# Patient Record
Sex: MALE | Race: ASIAN | NOT HISPANIC OR LATINO | Employment: FULL TIME | URBAN - METROPOLITAN AREA
[De-identification: names, ages, dates, MRNs, and addresses within clinical notes are randomized per-mention and may not be internally consistent; named-entity substitution may affect disease eponyms.]

---

## 2018-12-12 ENCOUNTER — NON-PROVIDER VISIT (OUTPATIENT)
Dept: OCCUPATIONAL MEDICINE | Facility: CLINIC | Age: 43
End: 2018-12-12

## 2018-12-12 DIAGNOSIS — Z02.1 PRE-EMPLOYMENT DRUG SCREENING: ICD-10-CM

## 2018-12-12 LAB
AMP AMPHETAMINE: NORMAL
COC COCAINE: NORMAL
INT CON NEG: NORMAL
INT CON POS: NORMAL
MET METHAMPHETAMINES: NORMAL
OPI OPIATES: NORMAL
PCP PHENCYCLIDINE: NORMAL
POC DRUG COMMENT 753798-OCCUPATIONAL HEALTH: NORMAL
THC: NORMAL

## 2018-12-12 PROCEDURE — 80305 DRUG TEST PRSMV DIR OPT OBS: CPT | Performed by: PREVENTIVE MEDICINE

## 2019-05-04 ENCOUNTER — HOSPITAL ENCOUNTER (OUTPATIENT)
Dept: LAB | Facility: MEDICAL CENTER | Age: 44
End: 2019-05-04
Attending: FAMILY MEDICINE
Payer: COMMERCIAL

## 2019-05-04 LAB
ALBUMIN SERPL BCP-MCNC: 4.3 G/DL (ref 3.2–4.9)
ALBUMIN/GLOB SERPL: 1.7 G/DL
ALP SERPL-CCNC: 65 U/L (ref 30–99)
ALT SERPL-CCNC: 44 U/L (ref 2–50)
ANION GAP SERPL CALC-SCNC: 8 MMOL/L (ref 0–11.9)
AST SERPL-CCNC: 28 U/L (ref 12–45)
BILIRUB SERPL-MCNC: 0.6 MG/DL (ref 0.1–1.5)
BUN SERPL-MCNC: 24 MG/DL (ref 8–22)
CALCIUM SERPL-MCNC: 9.1 MG/DL (ref 8.5–10.5)
CHLORIDE SERPL-SCNC: 108 MMOL/L (ref 96–112)
CHOLEST SERPL-MCNC: 181 MG/DL (ref 100–199)
CO2 SERPL-SCNC: 26 MMOL/L (ref 20–33)
CREAT SERPL-MCNC: 1.18 MG/DL (ref 0.5–1.4)
CREAT UR-MCNC: 238.3 MG/DL
FASTING STATUS PATIENT QL REPORTED: NORMAL
GLOBULIN SER CALC-MCNC: 2.5 G/DL (ref 1.9–3.5)
GLUCOSE SERPL-MCNC: 94 MG/DL (ref 65–99)
HDLC SERPL-MCNC: 37 MG/DL
LDLC SERPL CALC-MCNC: 122 MG/DL
MICROALBUMIN UR-MCNC: 0.8 MG/DL
MICROALBUMIN/CREAT UR: 3 MG/G (ref 0–30)
POTASSIUM SERPL-SCNC: 4.1 MMOL/L (ref 3.6–5.5)
PROT SERPL-MCNC: 6.8 G/DL (ref 6–8.2)
SODIUM SERPL-SCNC: 142 MMOL/L (ref 135–145)
TRIGL SERPL-MCNC: 112 MG/DL (ref 0–149)

## 2019-05-04 PROCEDURE — 80061 LIPID PANEL: CPT

## 2019-05-04 PROCEDURE — 36415 COLL VENOUS BLD VENIPUNCTURE: CPT

## 2019-05-04 PROCEDURE — 82043 UR ALBUMIN QUANTITATIVE: CPT

## 2019-05-04 PROCEDURE — 80053 COMPREHEN METABOLIC PANEL: CPT

## 2019-05-04 PROCEDURE — 82570 ASSAY OF URINE CREATININE: CPT

## 2021-10-17 ENCOUNTER — OFFICE VISIT (OUTPATIENT)
Dept: URGENT CARE | Facility: PHYSICIAN GROUP | Age: 46
End: 2021-10-17
Payer: COMMERCIAL

## 2021-10-17 ENCOUNTER — HOSPITAL ENCOUNTER (OUTPATIENT)
Dept: RADIOLOGY | Facility: MEDICAL CENTER | Age: 46
End: 2021-10-17
Attending: PHYSICIAN ASSISTANT
Payer: COMMERCIAL

## 2021-10-17 VITALS
HEART RATE: 91 BPM | RESPIRATION RATE: 16 BRPM | TEMPERATURE: 97.8 F | HEIGHT: 65 IN | DIASTOLIC BLOOD PRESSURE: 80 MMHG | BODY MASS INDEX: 34.62 KG/M2 | SYSTOLIC BLOOD PRESSURE: 110 MMHG | OXYGEN SATURATION: 99 % | WEIGHT: 207.8 LBS

## 2021-10-17 DIAGNOSIS — R10.9 LEFT FLANK PAIN: ICD-10-CM

## 2021-10-17 DIAGNOSIS — N20.0 NEPHROLITHIASIS: ICD-10-CM

## 2021-10-17 LAB
APPEARANCE UR: CLEAR
BILIRUB UR STRIP-MCNC: NEGATIVE MG/DL
COLOR UR AUTO: YELLOW
GLUCOSE UR STRIP.AUTO-MCNC: NEGATIVE MG/DL
KETONES UR STRIP.AUTO-MCNC: NEGATIVE MG/DL
LEUKOCYTE ESTERASE UR QL STRIP.AUTO: NEGATIVE
NITRITE UR QL STRIP.AUTO: NEGATIVE
PH UR STRIP.AUTO: 6 [PH] (ref 5–8)
PROT UR QL STRIP: 30 MG/DL
RBC UR QL AUTO: NORMAL
SP GR UR STRIP.AUTO: 1.03
UROBILINOGEN UR STRIP-MCNC: 0.2 MG/DL

## 2021-10-17 PROCEDURE — 81002 URINALYSIS NONAUTO W/O SCOPE: CPT | Performed by: PHYSICIAN ASSISTANT

## 2021-10-17 PROCEDURE — 74176 CT ABD & PELVIS W/O CONTRAST: CPT

## 2021-10-17 PROCEDURE — 99204 OFFICE O/P NEW MOD 45 MIN: CPT | Performed by: PHYSICIAN ASSISTANT

## 2021-10-17 RX ORDER — KETOROLAC TROMETHAMINE 30 MG/ML
30 INJECTION, SOLUTION INTRAMUSCULAR; INTRAVENOUS ONCE
Status: COMPLETED | OUTPATIENT
Start: 2021-10-17 | End: 2021-10-17

## 2021-10-17 RX ORDER — LOSARTAN POTASSIUM 25 MG/1
25 TABLET ORAL DAILY
COMMUNITY
End: 2023-11-27

## 2021-10-17 RX ADMIN — KETOROLAC TROMETHAMINE 30 MG: 30 INJECTION, SOLUTION INTRAMUSCULAR; INTRAVENOUS at 15:26

## 2021-10-17 ASSESSMENT — ENCOUNTER SYMPTOMS
ABDOMINAL PAIN: 1
FEVER: 0
BELCHING: 0
DIARRHEA: 0
CONSTIPATION: 1

## 2021-10-17 NOTE — PROGRESS NOTES
"  Subjective:   Regan Argueta is a 46 y.o. male who presents today with   Chief Complaint   Patient presents with   • Abdominal Pain     left, upper x today       Abdominal Pain  This is a new problem. The current episode started today. The onset quality is sudden. The problem occurs constantly. The problem has been unchanged. The pain is located in the left flank. The pain is moderate. The quality of the pain is aching and sharp. Associated symptoms include constipation. Pertinent negatives include no belching, diarrhea or fever. The pain is relieved by nothing. He has tried nothing for the symptoms. The treatment provided no relief.     Patient states he woke up with left-sided pain this morning.  Shortly after he woke up he had a bowel movement but was very small.  The pain did not get any better.  There was no blood in the stool.  Patient states this pain has happened before about a year ago and he spoke with his primary care about it but he states the pain resolved itself after a couple of hours at that time and he did have some blood in the stool a year ago.  No further work-up was obtained at that time.  No recent injury or trauma.   PMH:  has no past medical history on file.  MEDS:   Current Outpatient Medications:   •  losartan (COZAAR) 25 MG Tab, Take 25 mg by mouth every day., Disp: , Rfl:   ALLERGIES: No Known Allergies  SURGHX: History reviewed. No pertinent surgical history.  SOCHX:  reports that he has never smoked. He has never used smokeless tobacco.  FH: Family history of kidney stones.    Review of Systems   Constitutional: Negative for fever.   Gastrointestinal: Positive for abdominal pain and constipation. Negative for diarrhea.      Objective:   /80 (BP Location: Left arm, Patient Position: Sitting, BP Cuff Size: Adult)   Pulse 91   Temp 36.6 °C (97.8 °F) (Temporal)   Resp 16   Ht 1.651 m (5' 5\")   Wt 94.3 kg (207 lb 12.8 oz)   SpO2 99%   BMI 34.58 kg/m²   Physical Exam  Vitals and " nursing note reviewed.   Constitutional:       General: He is not in acute distress.     Appearance: Normal appearance. He is well-developed. He is not ill-appearing or toxic-appearing.   HENT:      Head: Normocephalic and atraumatic.      Right Ear: Hearing normal.      Left Ear: Hearing normal.   Eyes:      Conjunctiva/sclera: Conjunctivae normal.   Cardiovascular:      Rate and Rhythm: Normal rate and regular rhythm.      Heart sounds: Normal heart sounds.   Pulmonary:      Effort: Pulmonary effort is normal.   Abdominal:      General: Bowel sounds are normal.      Palpations: Abdomen is soft.      Tenderness: There is no abdominal tenderness. There is left CVA tenderness. There is no right CVA tenderness. Negative signs include Cordon's sign and McBurney's sign.   Genitourinary:     Comments: Patient deferred  exam  Musculoskeletal:      Comments: Normal movement in all 4 extremities.  No tenderness to palpation to the paraspinous muscles or midline of the spine.   Skin:     General: Skin is warm and dry.   Neurological:      Mental Status: He is alert.      Coordination: Coordination normal.   Psychiatric:         Mood and Affect: Mood normal.       UA + protein and blood  CT RENAL  FINDINGS:     Renal stone: There is an obstructing 4 mm calculus in the proximal left ureter with moderate upstream collecting system dilatation. Mild left perinephric stranding. Nonobstructive bilateral renal calculi.     Lung Bases:     No pulmonary nodules at the lung bases. No pleural or pericardial fluid.     Small hiatal hernia.     Abdomen:     Within the limits of noncontrast technique, the spleen, pancreas, and adrenal glands are unremarkable in appearance.     Hepatic steatosis.     The gallbladder is unremarkable     There is no biliary dilatation. There is no bowel wall thickening or abnormal dilatation.     Normal appendix.     The abdominal aorta is normal in caliber.     Pelvis:     Unremarkable urinary bladder.  Prostate calcification.     Bilateral pelvic phleboliths.     No lymph node enlargement.     No free fluid, or free air in the abdomen or pelvis.     No aggressive bone lesions are seen.     _____________________________________     IMPRESSION:        1. Obstructing 4 mm calculus in the proximal left ureter with moderate left hydronephrosis.     2. Nonobstructive bilateral renal calculi.        3. Hepatic steatosis. Small hiatal hernia.  Assessment/Plan:   Assessment    1. Left flank pain  - POCT Urinalysis  - CT-RENAL COLIC EVALUATION(A/P W/O); Future  - ketorolac (TORADOL) injection 30 mg    2. Nephrolithiasis  - REFERRAL TO UROLOGY    Other orders  - losartan (COZAAR) 25 MG Tab; Take 25 mg by mouth every day.  Will rule out kidney stones with a CT scan at this time.  Patient tolerated Toradol shot in clinic today.  Differential includes kidney stone versus constipation versus other abdominal etiology.  The soonest we can get a CT is this evening and discussed with patient that if his symptoms get any worse in the meantime he should go into the emergency room for evaluation and treatment.  Differential diagnosis, natural history, supportive care, and indications for immediate follow-up discussed.   Patient given instructions and understanding of medications and treatment.    If not improving in 3-5 days, F/U with PCP or return to  if symptoms worsen.    Patient agreeable to plan.  Greater than 45 minutes were spent reviewing patient's chart, examining and obtaining history from patient, and discussing plan of care.   Called and discussed CT results with patient.  He is understanding and appreciative of my call.  He states that the Toradol shot did help his symptoms.  Discussed with him that if his symptoms persist or get any worse he should report to the emergency room.  Should follow-up with urology.  Continue increasing fluid intake.  If urine output decreases another reason to go to the ER and he is  understanding of this.  Suspect spontaneous passage of the stone given the size and location.  Please note that this dictation was created using voice recognition software. I have made every reasonable attempt to correct obvious errors, but I expect that there are errors of grammar and possibly content that I did not discover before finalizing the note.    Denton Buckner PA-C

## 2021-10-18 ENCOUNTER — TELEPHONE (OUTPATIENT)
Dept: URGENT CARE | Facility: CLINIC | Age: 46
End: 2021-10-18

## 2023-11-27 ENCOUNTER — OFFICE VISIT (OUTPATIENT)
Dept: MEDICAL GROUP | Facility: PHYSICIAN GROUP | Age: 48
End: 2023-11-27
Payer: COMMERCIAL

## 2023-11-27 ENCOUNTER — HOSPITAL ENCOUNTER (OUTPATIENT)
Dept: LAB | Facility: MEDICAL CENTER | Age: 48
End: 2023-11-27
Attending: INTERNAL MEDICINE
Payer: COMMERCIAL

## 2023-11-27 VITALS
DIASTOLIC BLOOD PRESSURE: 90 MMHG | WEIGHT: 186 LBS | OXYGEN SATURATION: 95 % | HEART RATE: 94 BPM | BODY MASS INDEX: 31.76 KG/M2 | RESPIRATION RATE: 16 BRPM | TEMPERATURE: 98.2 F | HEIGHT: 64 IN | SYSTOLIC BLOOD PRESSURE: 134 MMHG

## 2023-11-27 DIAGNOSIS — Z11.59 NEED FOR HEPATITIS C SCREENING TEST: ICD-10-CM

## 2023-11-27 DIAGNOSIS — N20.0 KIDNEY STONE: ICD-10-CM

## 2023-11-27 DIAGNOSIS — R35.0 FREQUENT URINATION: ICD-10-CM

## 2023-11-27 DIAGNOSIS — Z23 NEED FOR VACCINATION: ICD-10-CM

## 2023-11-27 DIAGNOSIS — E66.3 OVERWEIGHT: ICD-10-CM

## 2023-11-27 DIAGNOSIS — I10 ESSENTIAL HYPERTENSION: ICD-10-CM

## 2023-11-27 DIAGNOSIS — E78.5 DYSLIPIDEMIA: ICD-10-CM

## 2023-11-27 DIAGNOSIS — Z00.00 WELL ADULT EXAM: ICD-10-CM

## 2023-11-27 DIAGNOSIS — Z12.11 COLON CANCER SCREENING: ICD-10-CM

## 2023-11-27 LAB
25(OH)D3 SERPL-MCNC: 21 NG/ML (ref 30–100)
ALT SERPL-CCNC: 23 U/L (ref 2–50)
ANION GAP SERPL CALC-SCNC: 26 MMOL/L (ref 7–16)
APPEARANCE UR: CLEAR
BACTERIA #/AREA URNS HPF: NEGATIVE /HPF
BILIRUB UR QL STRIP.AUTO: NEGATIVE
BUN SERPL-MCNC: 15 MG/DL (ref 8–22)
CALCIUM SERPL-MCNC: 9.7 MG/DL (ref 8.5–10.5)
CHLORIDE SERPL-SCNC: 94 MMOL/L (ref 96–112)
CHOLEST SERPL-MCNC: 419 MG/DL (ref 100–199)
CO2 SERPL-SCNC: 15 MMOL/L (ref 20–33)
COLOR UR: YELLOW
CREAT SERPL-MCNC: 0.92 MG/DL (ref 0.5–1.4)
EPI CELLS #/AREA URNS HPF: NEGATIVE /HPF
ERYTHROCYTE [DISTWIDTH] IN BLOOD BY AUTOMATED COUNT: 36.8 FL (ref 35.9–50)
EST. AVERAGE GLUCOSE BLD GHB EST-MCNC: 309 MG/DL
FASTING STATUS PATIENT QL REPORTED: NORMAL
GFR SERPLBLD CREATININE-BSD FMLA CKD-EPI: 101 ML/MIN/1.73 M 2
GLUCOSE SERPL-MCNC: 337 MG/DL (ref 65–99)
GLUCOSE UR STRIP.AUTO-MCNC: >=1000 MG/DL
HBA1C MFR BLD: 12.4 % (ref 4–5.6)
HCT VFR BLD AUTO: 51.8 % (ref 42–52)
HCV AB SER QL: NORMAL
HDLC SERPL-MCNC: ABNORMAL MG/DL
HGB BLD-MCNC: 18.7 G/DL (ref 14–18)
HYALINE CASTS #/AREA URNS LPF: ABNORMAL /LPF
KETONES UR STRIP.AUTO-MCNC: 40 MG/DL
LDLC SERPL CALC-MCNC: ABNORMAL MG/DL
LEUKOCYTE ESTERASE UR QL STRIP.AUTO: NEGATIVE
MCH RBC QN AUTO: 29.9 PG (ref 27–33)
MCHC RBC AUTO-ENTMCNC: 36.1 G/DL (ref 32.3–36.5)
MCV RBC AUTO: 82.9 FL (ref 81.4–97.8)
MICRO URNS: ABNORMAL
NITRITE UR QL STRIP.AUTO: NEGATIVE
PH UR STRIP.AUTO: 5.5 [PH] (ref 5–8)
PLATELET # BLD AUTO: 290 K/UL (ref 164–446)
PMV BLD AUTO: 10.1 FL (ref 9–12.9)
POTASSIUM SERPL-SCNC: 4.4 MMOL/L (ref 3.6–5.5)
PROT UR QL STRIP: 100 MG/DL
PSA SERPL-MCNC: 1.78 NG/ML (ref 0–4)
RBC # BLD AUTO: 6.25 M/UL (ref 4.7–6.1)
RBC # URNS HPF: ABNORMAL /HPF
RBC UR QL AUTO: NEGATIVE
SODIUM SERPL-SCNC: 135 MMOL/L (ref 135–145)
SP GR UR STRIP.AUTO: >=1.045
TRIGL SERPL-MCNC: 1679 MG/DL (ref 0–149)
TSH SERPL DL<=0.005 MIU/L-ACNC: 1.14 UIU/ML (ref 0.38–5.33)
UROBILINOGEN UR STRIP.AUTO-MCNC: 0.2 MG/DL
WBC # BLD AUTO: 6.6 K/UL (ref 4.8–10.8)
WBC #/AREA URNS HPF: ABNORMAL /HPF

## 2023-11-27 PROCEDURE — 3075F SYST BP GE 130 - 139MM HG: CPT | Performed by: INTERNAL MEDICINE

## 2023-11-27 PROCEDURE — 83036 HEMOGLOBIN GLYCOSYLATED A1C: CPT

## 2023-11-27 PROCEDURE — 84153 ASSAY OF PSA TOTAL: CPT

## 2023-11-27 PROCEDURE — 90471 IMMUNIZATION ADMIN: CPT | Performed by: INTERNAL MEDICINE

## 2023-11-27 PROCEDURE — 3080F DIAST BP >= 90 MM HG: CPT | Performed by: INTERNAL MEDICINE

## 2023-11-27 PROCEDURE — 80061 LIPID PANEL: CPT

## 2023-11-27 PROCEDURE — 90472 IMMUNIZATION ADMIN EACH ADD: CPT | Performed by: INTERNAL MEDICINE

## 2023-11-27 PROCEDURE — 84460 ALANINE AMINO (ALT) (SGPT): CPT

## 2023-11-27 PROCEDURE — 80048 BASIC METABOLIC PNL TOTAL CA: CPT

## 2023-11-27 PROCEDURE — 36415 COLL VENOUS BLD VENIPUNCTURE: CPT

## 2023-11-27 PROCEDURE — 99214 OFFICE O/P EST MOD 30 MIN: CPT | Mod: 25 | Performed by: INTERNAL MEDICINE

## 2023-11-27 PROCEDURE — 90686 IIV4 VACC NO PRSV 0.5 ML IM: CPT | Performed by: INTERNAL MEDICINE

## 2023-11-27 PROCEDURE — 87086 URINE CULTURE/COLONY COUNT: CPT

## 2023-11-27 PROCEDURE — 90715 TDAP VACCINE 7 YRS/> IM: CPT | Performed by: INTERNAL MEDICINE

## 2023-11-27 PROCEDURE — 81001 URINALYSIS AUTO W/SCOPE: CPT

## 2023-11-27 PROCEDURE — 85027 COMPLETE CBC AUTOMATED: CPT

## 2023-11-27 PROCEDURE — 86803 HEPATITIS C AB TEST: CPT

## 2023-11-27 PROCEDURE — 82306 VITAMIN D 25 HYDROXY: CPT

## 2023-11-27 PROCEDURE — 84443 ASSAY THYROID STIM HORMONE: CPT

## 2023-11-27 RX ORDER — AMLODIPINE BESYLATE 5 MG/1
5 TABLET ORAL DAILY
Qty: 90 TABLET | Refills: 3 | Status: SHIPPED | OUTPATIENT
Start: 2023-11-27

## 2023-11-27 ASSESSMENT — PATIENT HEALTH QUESTIONNAIRE - PHQ9: CLINICAL INTERPRETATION OF PHQ2 SCORE: 0

## 2023-11-27 NOTE — ASSESSMENT & PLAN NOTE
This is a new condition.  The patient reported frequent urination noted since last 2 months.  Patient denies fever or chills dysuria or hematuria.  Patient also reported frequent thirst as well as nighttime urination up to 4 times per night.

## 2023-11-27 NOTE — ASSESSMENT & PLAN NOTE
Chronic condition.  Discussed with the patient regarding diet, exercise, and lifestyle modification to help achieve and maintain healthy weight

## 2023-11-27 NOTE — ASSESSMENT & PLAN NOTE
Chronic condition.  Noted with chart review.  The patient currently on diet therapy.  Lab test requested for follow-up.

## 2023-11-27 NOTE — PROGRESS NOTES
PRIMARY CARE CLINIC VISIT        Chief Complaint   Patient presents with    Establish Care   New patient here to establish care  Frequent urination  Hypertension  Overweight  Request influenza and Tdap vaccine  Kidney stone          History of Present Illness     Frequent urination  This is a new condition.  The patient reported frequent urination noted since last 2 months.  Patient denies fever or chills dysuria or hematuria.  Patient also reported frequent thirst as well as nighttime urination up to 4 times per night.    Essential hypertension  Chronic condition.  The patient reported that he stopped taking losartan due to erectile dysfunction.  Currently patient not on therapy.    Overweight  Chronic condition.  Discussed with the patient regarding diet, exercise, and lifestyle modification to help achieve and maintain healthy weight         Need for vaccination  Patient is due for influenza vaccine and Tdap    Kidney stone  Chronic condition.  The patient currently not on therapy.  He denies recent kidney stone flareup.    Dyslipidemia  Chronic condition.  Noted with chart review.  The patient currently on diet therapy.  Lab test requested for follow-up.    No current outpatient medications on file prior to visit.     No current facility-administered medications on file prior to visit.        Allergies: Patient has no known allergies.    Current Outpatient Medications Ordered in Epic   Medication Sig Dispense Refill    amLODIPine (NORVASC) 5 MG Tab Take 1 Tablet by mouth every day. 90 Tablet 3     No current Epic-ordered facility-administered medications on file.       History reviewed. No pertinent past medical history.    History reviewed. No pertinent surgical history.    Family History   Problem Relation Age of Onset    Stroke Mother     Kidney Disease Father        Social History     Tobacco Use   Smoking Status Former    Types: Cigarettes   Smokeless Tobacco Never       Social History     Substance and  "Sexual Activity   Alcohol Use None    Comment: occasionally       Review of systems.  As per HPI above. All other systems reviewed and negative.      Past Medical, Social, and Family history reviewed and updated in EPIC     Objective     BP (!) 134/90 (BP Location: Right arm, Patient Position: Sitting, BP Cuff Size: Adult)   Pulse 94   Temp 36.8 °C (98.2 °F) (Temporal)   Resp 16   Ht 1.626 m (5' 4\")   Wt 84.4 kg (186 lb)   SpO2 95%    Body mass index is 31.93 kg/m².    General: alert in no apparent distress.  Cardiovascular: regular rate and rhythm  Pulmonary: lungs : no wheezing   Gastrointestinal: BS present.    normal male genitalia no urethral discharge  Rectal no active bleeding prostate no obvious mass nontender the size of the prostate felt to be enlarged on exam.  Nontender      No results found for: \"HBA1C\"    No results found for: \"WBC\", \"HEMOGLOBIN\", \"HEMATOCRIT\", \"MCV\", \"PLATELETCT\"      Lab Results   Component Value Date/Time    SODIUM 142 05/04/2019 08:57 AM    POTASSIUM 4.1 05/04/2019 08:57 AM    GLUCOSE 94 05/04/2019 08:57 AM    BUN 24 (H) 05/04/2019 08:57 AM    CREATININE 1.18 05/04/2019 08:57 AM       Lab Results   Component Value Date/Time    CHOLSTRLTOT 181 05/04/2019 08:57 AM    TRIGLYCERIDE 112 05/04/2019 08:57 AM    HDL 37 (A) 05/04/2019 08:57 AM     (H) 05/04/2019 08:57 AM       Lab Results   Component Value Date/Time    ALTSGPT 44 05/04/2019 08:57 AM             Assessment and Plan     1. Frequent urination  This is a new condition.  Rule out possible diabetes versus renal insufficiency versus prostate hypertrophy versus overactive bladder versus others  Lab test ordered today including chemistry and urinalysis.  Recommend follow-up after lab test done.  If all of the labs came back okay consider treating the patient with tamsulosin  Recommend follow-up few days after lab test done.    2. Essential hypertension  Chronic condition.  Uncontrolled.  Prescribed amlodipine 5 mg " daily.  Recommend office follow-up approximately 4 weeks for blood pressure recheck.  Recommend sodium restriction    3. Dyslipidemia  Chronic condition.  Current status unclear.  Lab test ordered for follow-up    4. Overweight  Chronic condition per uncontrolled.  Recommend diet exercise.  Courage patient to lose weight.    5. Kidney stone  Chronic condition.  Patient currently asymptomatic.  Advised the patient to stay well-hydrated.  Continue to monitor    6. Need for vaccination  - Tdap Vaccine =>6YO IM  - INFLUENZA VACCINE QUAD INJ (PF)    7. Well adult exam  - HEMOGLOBIN A1C; Future  - ALANINE AMINO-TRANS; Future  - Basic Metabolic Panel; Future  - CBC WITHOUT DIFFERENTIAL; Future  - Lipid Profile; Future  - PROSTATE SPECIFIC AG SCREENING; Future  - TSH; Future  - URINALYSIS; Future  - URINE CULTURE(NEW); Future  - VITAMIN D,25 HYDROXY (DEFICIENCY); Future    8. Colon cancer screening  - COLOGUARD (FIT DNA)    Other orders  - amLODIPine (NORVASC) 5 MG Tab; Take 1 Tablet by mouth every day.  Dispense: 90 Tablet; Refill: 3                      Please note that this dictation was created using voice recognition software. I have made every reasonable attempt to correct obvious errors, but I expect that there are errors of grammar and possibly content that I did not discover before finalizing the note.    Denis Jose MD  Internal Medicine  [facility]

## 2023-11-27 NOTE — ASSESSMENT & PLAN NOTE
Chronic condition.  The patient reported that he stopped taking losartan due to erectile dysfunction.  Currently patient not on therapy.

## 2023-11-29 DIAGNOSIS — E11.9 TYPE 2 DIABETES MELLITUS WITHOUT COMPLICATION, WITH LONG-TERM CURRENT USE OF INSULIN (HCC): ICD-10-CM

## 2023-11-29 DIAGNOSIS — R80.0 ISOLATED PROTEINURIA WITHOUT SPECIFIC MORPHOLOGIC LESION: ICD-10-CM

## 2023-11-29 DIAGNOSIS — Z79.4 TYPE 2 DIABETES MELLITUS WITHOUT COMPLICATION, WITH LONG-TERM CURRENT USE OF INSULIN (HCC): ICD-10-CM

## 2023-11-29 DIAGNOSIS — E55.9 VITAMIN D DEFICIENCY: ICD-10-CM

## 2023-11-29 DIAGNOSIS — E78.5 DYSLIPIDEMIA: ICD-10-CM

## 2023-11-29 LAB
BACTERIA UR CULT: NORMAL
SIGNIFICANT IND 70042: NORMAL
SITE SITE: NORMAL
SOURCE SOURCE: NORMAL

## 2023-11-29 RX ORDER — ROSUVASTATIN CALCIUM 20 MG/1
20 TABLET, COATED ORAL EVERY EVENING
Qty: 90 TABLET | Refills: 3 | Status: SHIPPED | OUTPATIENT
Start: 2023-11-29

## 2023-11-29 RX ORDER — EMPAGLIFLOZIN, METFORMIN HYDROCHLORIDE 12.5; 1 MG/1; MG/1
1 TABLET, EXTENDED RELEASE ORAL 2 TIMES DAILY
Qty: 180 TABLET | Refills: 3 | Status: SHIPPED | OUTPATIENT
Start: 2023-11-29

## 2024-06-26 DIAGNOSIS — Z79.4 TYPE 2 DIABETES MELLITUS WITHOUT COMPLICATION, WITH LONG-TERM CURRENT USE OF INSULIN (HCC): ICD-10-CM

## 2024-06-26 DIAGNOSIS — E11.9 TYPE 2 DIABETES MELLITUS WITHOUT COMPLICATION, WITH LONG-TERM CURRENT USE OF INSULIN (HCC): ICD-10-CM

## 2024-07-02 ENCOUNTER — TELEPHONE (OUTPATIENT)
Dept: HEALTH INFORMATION MANAGEMENT | Facility: OTHER | Age: 49
End: 2024-07-02

## 2024-12-17 RX ORDER — ROSUVASTATIN CALCIUM 20 MG/1
20 TABLET, COATED ORAL EVERY EVENING
Qty: 60 TABLET | Refills: 0 | Status: SHIPPED | OUTPATIENT
Start: 2024-12-17

## 2024-12-17 NOTE — TELEPHONE ENCOUNTER
Received request via: Pharmacy    Was the patient seen in the last year in this department? Yes    Does the patient have an active prescription (recently filled or refills available) for medication(s) requested? No    Pharmacy Name:   [id number] - [place], [place] - [address] AT [facility] & [address]  [address] [facility] [address]  Phone: [phone] Fax: [phone]        Does the patient have MCFP Plus and need 100-day supply? (This applies to ALL medications) Patient does not have SCP

## 2024-12-19 RX ORDER — AMLODIPINE BESYLATE 5 MG/1
5 TABLET ORAL DAILY
Qty: 30 TABLET | Refills: 0 | Status: SHIPPED | OUTPATIENT
Start: 2024-12-19

## 2024-12-19 NOTE — TELEPHONE ENCOUNTER
Received request via: Pharmacy    Was the patient seen in the last year in this department? No 11/27/2023    Does the patient have an active prescription (recently filled or refills available) for medication(s) requested? No    Pharmacy Name: [id number] - [place], [place] - [address] [facility]  [address]     Does the patient have FCI Plus and need 100-day supply? (This applies to ALL medications) Patient does not have SCP

## 2025-04-07 RX ORDER — OLMESARTAN MEDOXOMIL 20 MG/1
1 TABLET ORAL
COMMUNITY
End: 2025-04-08 | Stop reason: SDUPTHER

## 2025-04-07 RX ORDER — TAMSULOSIN HYDROCHLORIDE 0.4 MG/1
CAPSULE ORAL
COMMUNITY
End: 2025-04-08 | Stop reason: SDUPTHER

## 2025-04-08 ENCOUNTER — RESULTS FOLLOW-UP (OUTPATIENT)
Dept: MEDICAL GROUP | Facility: PHYSICIAN GROUP | Age: 50
End: 2025-04-08

## 2025-04-08 ENCOUNTER — HOSPITAL ENCOUNTER (OUTPATIENT)
Facility: MEDICAL CENTER | Age: 50
End: 2025-04-08
Attending: INTERNAL MEDICINE
Payer: COMMERCIAL

## 2025-04-08 ENCOUNTER — APPOINTMENT (OUTPATIENT)
Dept: MEDICAL GROUP | Facility: PHYSICIAN GROUP | Age: 50
End: 2025-04-08

## 2025-04-08 VITALS
TEMPERATURE: 98 F | HEIGHT: 64 IN | OXYGEN SATURATION: 99 % | RESPIRATION RATE: 16 BRPM | BODY MASS INDEX: 32.5 KG/M2 | WEIGHT: 190.4 LBS | HEART RATE: 74 BPM | SYSTOLIC BLOOD PRESSURE: 122 MMHG | DIASTOLIC BLOOD PRESSURE: 78 MMHG

## 2025-04-08 DIAGNOSIS — K44.9 HIATAL HERNIA: ICD-10-CM

## 2025-04-08 DIAGNOSIS — Z23 NEED FOR VACCINATION: ICD-10-CM

## 2025-04-08 DIAGNOSIS — R80.0 ISOLATED PROTEINURIA WITHOUT SPECIFIC MORPHOLOGIC LESION: Chronic | ICD-10-CM

## 2025-04-08 DIAGNOSIS — E11.59 HYPERTENSION ASSOCIATED WITH TYPE 2 DIABETES MELLITUS (HCC): Chronic | ICD-10-CM

## 2025-04-08 DIAGNOSIS — I15.2 HYPERTENSION ASSOCIATED WITH TYPE 2 DIABETES MELLITUS (HCC): Chronic | ICD-10-CM

## 2025-04-08 DIAGNOSIS — Z12.5 SCREENING FOR MALIGNANT NEOPLASM OF PROSTATE: ICD-10-CM

## 2025-04-08 DIAGNOSIS — E78.5 TYPE 2 DIABETES MELLITUS WITH HYPERLIPIDEMIA (HCC): ICD-10-CM

## 2025-04-08 DIAGNOSIS — N20.0 KIDNEY STONE: ICD-10-CM

## 2025-04-08 DIAGNOSIS — E11.69 DYSLIPIDEMIA ASSOCIATED WITH TYPE 2 DIABETES MELLITUS (HCC): Chronic | ICD-10-CM

## 2025-04-08 DIAGNOSIS — E11.69 TYPE 2 DIABETES MELLITUS WITH HYPERLIPIDEMIA (HCC): ICD-10-CM

## 2025-04-08 DIAGNOSIS — E66.3 OVERWEIGHT: Chronic | ICD-10-CM

## 2025-04-08 DIAGNOSIS — E78.5 DYSLIPIDEMIA ASSOCIATED WITH TYPE 2 DIABETES MELLITUS (HCC): Chronic | ICD-10-CM

## 2025-04-08 DIAGNOSIS — K76.0 HEPATIC STEATOSIS: ICD-10-CM

## 2025-04-08 DIAGNOSIS — N40.0 PROSTATE HYPERTROPHY: ICD-10-CM

## 2025-04-08 PROBLEM — R35.0 FREQUENT URINATION: Status: RESOLVED | Noted: 2023-11-27 | Resolved: 2025-04-08

## 2025-04-08 PROBLEM — E55.9 VITAMIN D DEFICIENCY: Status: RESOLVED | Noted: 2023-11-29 | Resolved: 2025-04-08

## 2025-04-08 LAB
CREAT UR-MCNC: 75.5 MG/DL
HBA1C MFR BLD: 6.2 % (ref ?–5.8)
MICROALBUMIN UR-MCNC: 3 MG/DL
MICROALBUMIN/CREAT UR: 40 MG/G (ref 0–30)
POCT INT CON NEG: NEGATIVE
POCT INT CON POS: POSITIVE

## 2025-04-08 PROCEDURE — 3078F DIAST BP <80 MM HG: CPT | Performed by: INTERNAL MEDICINE

## 2025-04-08 PROCEDURE — 92250 FUNDUS PHOTOGRAPHY W/I&R: CPT | Mod: TC | Performed by: INTERNAL MEDICINE

## 2025-04-08 PROCEDURE — 90677 PCV20 VACCINE IM: CPT

## 2025-04-08 PROCEDURE — 82043 UR ALBUMIN QUANTITATIVE: CPT

## 2025-04-08 PROCEDURE — 90471 IMMUNIZATION ADMIN: CPT

## 2025-04-08 PROCEDURE — 3074F SYST BP LT 130 MM HG: CPT | Performed by: INTERNAL MEDICINE

## 2025-04-08 PROCEDURE — 99215 OFFICE O/P EST HI 40 MIN: CPT | Mod: 25 | Performed by: INTERNAL MEDICINE

## 2025-04-08 PROCEDURE — 82570 ASSAY OF URINE CREATININE: CPT

## 2025-04-08 PROCEDURE — 83036 HEMOGLOBIN GLYCOSYLATED A1C: CPT | Performed by: INTERNAL MEDICINE

## 2025-04-08 RX ORDER — EMPAGLIFLOZIN, METFORMIN HYDROCHLORIDE 12.5; 1 MG/1; MG/1
1 TABLET, EXTENDED RELEASE ORAL 2 TIMES DAILY
Qty: 180 TABLET | Refills: 3 | Status: SHIPPED | OUTPATIENT
Start: 2025-04-08

## 2025-04-08 RX ORDER — AMLODIPINE BESYLATE 5 MG/1
5 TABLET ORAL DAILY
Qty: 90 TABLET | Refills: 3 | Status: SHIPPED | OUTPATIENT
Start: 2025-04-08

## 2025-04-08 RX ORDER — ROSUVASTATIN CALCIUM 20 MG/1
20 TABLET, COATED ORAL EVERY EVENING
Qty: 90 TABLET | Refills: 3 | Status: SHIPPED | OUTPATIENT
Start: 2025-04-08

## 2025-04-08 RX ORDER — OLMESARTAN MEDOXOMIL 20 MG/1
20 TABLET ORAL
Qty: 90 TABLET | Refills: 3 | Status: SHIPPED | OUTPATIENT
Start: 2025-04-08

## 2025-04-08 RX ORDER — TAMSULOSIN HYDROCHLORIDE 0.4 MG/1
0.4 CAPSULE ORAL DAILY
Qty: 90 CAPSULE | Refills: 3 | Status: SHIPPED | OUTPATIENT
Start: 2025-04-08

## 2025-04-08 ASSESSMENT — PATIENT HEALTH QUESTIONNAIRE - PHQ9: CLINICAL INTERPRETATION OF PHQ2 SCORE: 0

## 2025-04-08 NOTE — ASSESSMENT & PLAN NOTE
This is a chronic condition.  The patient has not been seen for over 1 year.  Patient asymptomatic.  Patient currently taking Synjardy 1 tablet twice daily.    Hemoglobin A1c today 6.2%

## 2025-04-08 NOTE — PROGRESS NOTES
PRIMARY CARE CLINIC VISIT        Chief Complaint   Patient presents with    Follow-Up    Medication Refill      Follow-up diabetes  Hypertension  Hyperlipidemia  Overweight  Proteinuria  Prostate hypertrophy  Rx refills  Kidney stone  Vaccination update  hepatic steatosis  Hiatal hernia  Request lab tests        History of Present Illness     Type 2 diabetes mellitus with hyperlipidemia (HCC)  This is a chronic condition.  The patient has not been seen for over 1 year.  Patient asymptomatic.  Patient currently taking Synjardy 1 tablet twice daily.    Hemoglobin A1c today 6.2%    Hypertension associated with type 2 diabetes mellitus (HCC)  Chronic condition.  The patient currently taking Benicar.  He also take amlodipine daily.  Patient denies chest pain shortness of breath palpitation or near syncope    Overweight  Body mass index is 32.68 kg/m².   Chronic condition.  Recommend pt to follow a healthy , well balance diet  Pt to continue with regular exercise activity and to maintain ideal weight.        Dyslipidemia associated with type 2 diabetes mellitus (HCC)  Chronic condition.  Patient is taking Crestor.  Patient is due for lab test.    proteinuria  Chronic condition.  Patient currently taking Benicar.  Lab test requested for follow-up.    Prostate hypertrophy  This is a chronic condition.  The patient is currently taking tamsulosin.  Patient denies dysuria or hematuria.    Need for vaccination  Recommended Pt to get shingles vaccine from the pharmacy as this is not available in the office.      Kidney stone  This is a chronic condition.  Patient denies any recent kidney stone flareups  Denies dysuria or hematuria.    Hepatic steatosis  This is a chronic condition.  Noted with previous CT scan 2021.  Patient asymptomatic.  Recommend liver panel to be done for follow-up.    Hiatal hernia  This is a chronic condition.  Noted with previous CT scan.  Patient denies nausea vomiting or dysphagia.    No current  outpatient medications on file prior to visit.     No current facility-administered medications on file prior to visit.        Allergies: Patient has no known allergies.    Current Outpatient Medications Ordered in Epic   Medication Sig Dispense Refill    amLODIPine (NORVASC) 5 MG Tab Take 1 Tablet by mouth every day. Needs follow-up with PCP before further refills 90 Tablet 3    Empagliflozin-metFORMIN HCl ER (SYNJARDY XR) 12.5-1000 MG TABLET SR 24 HR Take 1 Tablet by mouth 2 times a day. 180 Tablet 3    olmesartan (BENICAR) 20 MG Tab Take 1 Tablet by mouth every day. 90 Tablet 3    rosuvastatin (CRESTOR) 20 MG Tab Take 1 Tablet by mouth every evening. 90 Tablet 3    tamsulosin (FLOMAX) 0.4 MG capsule Take 1 Capsule by mouth every day. 90 Capsule 3     No current [facility]-ordered facility-administered medications on file.       History reviewed. No pertinent past medical history.    History reviewed. No pertinent surgical history.    Family History   Problem Relation Age of Onset    Stroke Mother     Kidney Disease Father        Social History     Tobacco Use   Smoking Status Former    Types: Cigarettes   Smokeless Tobacco Never       Social History     Substance and Sexual Activity   Alcohol Use None    Comment: occasionally       Review of systems  As per HPI above. All other systems reviewed and negative.      Past Medical, Social, and Family history reviewed and updated in [facility]       LAB DATA:     I have independently reviewed / interpreted labs    Lab Results   Component Value Date/Time    HBA1C 6.2 (A) 04/08/2025 08:52 AM    HBA1C 12.4 (H) 11/27/2023 09:07 AM        Lab Results   Component Value Date/Time    WBC 6.6 11/27/2023 09:07 AM    HEMOGLOBIN 18.7 (H) 11/27/2023 09:07 AM    HEMATOCRIT 51.8 11/27/2023 09:07 AM    MCV 82.9 11/27/2023 09:07 AM    PLATELETCT 290 11/27/2023 09:07 AM       Lab Results   Component Value Date/Time    SODIUM 135 11/27/2023 09:07 AM    POTASSIUM 4.4 11/27/2023 09:07 AM    GLUCOSE  "337 (H) 11/27/2023 09:07 AM    BUN 15 11/27/2023 09:07 AM    CREATININE 0.92 11/27/2023 09:07 AM       Lab Results   Component Value Date/Time    CHOLSTRLTOT 419 (H) 11/27/2023 09:07 AM    TRIGLYCERIDE 1679 (H) 11/27/2023 09:07 AM    HDL see below 11/27/2023 09:07 AM    LDL see below 11/27/2023 09:07 AM       Lab Results   Component Value Date/Time    ALTSGPT 23 11/27/2023 09:07 AM          Objective     /78 (BP Location: Left arm, Patient Position: Sitting, BP Cuff Size: Adult)   Pulse 74   Temp 36.7 °C (98 °F) (Temporal)   Resp 16   Ht 1.626 m (5' 4\")   Wt 86.4 kg (190 lb 6.4 oz)   SpO2 99%    Body mass index is 32.68 kg/m².    General: alert in no apparent distress.  Cardiovascular: regular rate and rhythm  Pulmonary: lungs : no wheezing   Gastrointestinal: BS present.   Monofilament testing with a 10 gram force: sensation intact: intact bilaterally  Visual Inspection: Feet without maceration, ulcers, fissures.  Pedal pulses: intact bilaterally     Assessment and Plan     1. Type 2 diabetes mellitus with hyperlipidemia (HCC)  Chronic stable condition.  A1c below 7%.  Patient to continue with Synjardy 1 tablet twice daily.  Clinical notes:   -Discussed with the patient goals for Diabetes management:   HbA1C < 7% /  Fasting BG   /  2 hrs post meal BG below 160  -Reviewed pathophysiology of diabetes and the importance of glycemic control to reduce the risk of diabetes related complications   Microvascular: retinopathy, neuropathy, nephropathy  Macrovascular: heart attack, stroke, peripheral vascular dz  -Advised pt to monitor sugar closely  -Counseled pt the importance of recognizing and treating hypoglycemia.   -The importance of increasing physical activity to improve diabetes control  discussed with the patient.   -Patient was also educated on changing diet and making better choices to help control blood sugar.          - POCT A1C  - Microalbumin Creat Ratio Urine (Clinic Collect); Future  - " POCT Retinal Eye Exam  - Diabetic Monofilament LE Exam  - Basic Metabolic Panel; Future  - Empagliflozin-metFORMIN HCl ER (SYNJARDY XR) 12.5-1000 MG TABLET SR 24 HR; Take 1 Tablet by mouth 2 times a day.  Dispense: 180 Tablet; Refill: 3    2. Prostate hypertrophy  - tamsulosin (FLOMAX) 0.4 MG capsule; Take 1 Capsule by mouth every day.  Dispense: 90 Capsule; Refill: 3  Stable.  Continue tamsulosin 0.4 Mg daily    3. Hypertension associated with type 2 diabetes mellitus (HCC)  - CBC WITH DIFFERENTIAL; Future  - amLODIPine (NORVASC) 5 MG Tab; Take 1 Tablet by mouth every day. Needs follow-up with PCP before further refills  Dispense: 90 Tablet; Refill: 3  - olmesartan (BENICAR) 20 MG Tab; Take 1 Tablet by mouth every day.  Dispense: 90 Tablet; Refill: 3  Stable.  Continue Benicar 20 Mg daily and amlodipine 5 mg daily.  Continue to monitor blood pressure on regular basis at home    4. Overweight  Chronic condition.  Recommend diet and lifestyle modification.  Encouraged patient to maintain ideal weight    5. Dyslipidemia associated with type 2 diabetes mellitus (HCC)  - ALANINE AMINO-TRANS; Future  - Lipid Profile; Future  - rosuvastatin (CRESTOR) 20 MG Tab; Take 1 Tablet by mouth every evening.  Dispense: 90 Tablet; Refill: 3  Chronic condition.  Current status unclear.  Lab test requested to check lipid panel.  Continue Crestor 20 Mg daily at this time.  Recommend follow-up after lab test done    6. proteinuria  - PROTEIN/CREAT RATIO URINE; Future  Chronic condition.  Current status unclear.  Urine protein creatinine ratio ordered.  Recommend low protein diet    7. Screening for malignant neoplasm of prostate  - PROSTATE SPECIFIC AG SCREENING; Future    8. Need for vaccination  - Pneumococcal Conjugate Vaccine 20-Valent  Recommended Pt to get shingles vaccine from the pharmacy as this is not available in the office.      9.  Kidney stone.  Chronic condition.  Patient currently asymptomatic.  Advised the patient to  stay well-hydrated.  Continue to monitor.    10.  Hepatic steatosis.  Chronic condition.  Patient asymptomatic.  Recommend liver panel to be done for follow-up.  Advised the patient to avoid EtOH.    11.  Hiatal hernia.  Chronic condition.  Patient asymptomatic.  Continue to monitor.      Time spent:   43   minutes -    November 27, 2023 medical office note  January 7, 2022 urology note  November 30, 2021 urology note  November 1, 2021 urology note  October 17, 2021 urgent care note  Laboratory data November 27, 2023, November 17, 2021, May 4, 2019  Radiology report October 17, 2021    Total time includes face to face time and non-face to face time  Chart review before the visit, the actual patient visit, and time spent on documentation in the EMR after the visit.  Chart review/prep, review of other providers' records, Independent review of imagings/labs ,  time for history/examination , pt's counseling/education, ordering, prescribing, treatment plan discussed with patient, and care coordination.           Please note that this dictation was created using voice recognition software. I have made every reasonable attempt to correct obvious errors, but I expect that there are errors of grammar and possibly content that I did not discover before finalizing the note.    Denis Jose MD  Internal Medicine  [facility]

## 2025-04-08 NOTE — ASSESSMENT & PLAN NOTE
Chronic condition.  The patient currently taking Benicar.  He also take amlodipine daily.  Patient denies chest pain shortness of breath palpitation or near syncope

## 2025-04-08 NOTE — ASSESSMENT & PLAN NOTE
Body mass index is 32.68 kg/m².   Chronic condition.  Recommend pt to follow a healthy , well balance diet  Pt to continue with regular exercise activity and to maintain ideal weight.

## 2025-04-08 NOTE — ASSESSMENT & PLAN NOTE
This is a chronic condition.  The patient is currently taking tamsulosin.  Patient denies dysuria or hematuria.

## 2025-04-09 ENCOUNTER — RESULTS FOLLOW-UP (OUTPATIENT)
Dept: MEDICAL GROUP | Facility: PHYSICIAN GROUP | Age: 50
End: 2025-04-09
Payer: COMMERCIAL

## 2025-04-09 LAB — RETINAL SCREEN: NEGATIVE

## 2025-04-09 NOTE — ASSESSMENT & PLAN NOTE
This is a chronic condition.  Noted with previous CT scan 2021.  Patient asymptomatic.  Recommend liver panel to be done for follow-up.

## 2025-04-09 NOTE — ASSESSMENT & PLAN NOTE
This is a chronic condition.  Noted with previous CT scan.  Patient denies nausea vomiting or dysphagia.

## 2025-04-09 NOTE — ASSESSMENT & PLAN NOTE
This is a chronic condition.  Patient denies any recent kidney stone flareups  Denies dysuria or hematuria.